# Patient Record
Sex: FEMALE | Race: WHITE | URBAN - METROPOLITAN AREA
[De-identification: names, ages, dates, MRNs, and addresses within clinical notes are randomized per-mention and may not be internally consistent; named-entity substitution may affect disease eponyms.]

---

## 2023-05-31 ENCOUNTER — OFFICE VISIT (OUTPATIENT)
Facility: LOCATION | Age: 78
End: 2023-05-31
Payer: MEDICARE

## 2023-05-31 DIAGNOSIS — Z96.1 PRESENCE OF INTRAOCULAR LENS: ICD-10-CM

## 2023-05-31 DIAGNOSIS — H34.8322 TRIBUTARY (BRANCH) RETINAL VEIN OCCLUSION, LEFT EYE, STABLE: Primary | ICD-10-CM

## 2023-05-31 PROCEDURE — 99204 OFFICE O/P NEW MOD 45 MIN: CPT | Performed by: OPHTHALMOLOGY

## 2023-05-31 PROCEDURE — 92134 CPTRZ OPH DX IMG PST SGM RTA: CPT | Performed by: OPHTHALMOLOGY

## 2023-05-31 ASSESSMENT — INTRAOCULAR PRESSURE
OD: 18
OS: 18

## 2023-05-31 NOTE — IMPRESSION/PLAN
Impression: Tributary (branch) retinal vein occlusion, left eye, stable: [id number].
- remote OCT: 
OD: wnl OS: superior atrophy Plan: The diagnosis, natural history, and prognosis of retinal vein occlusion, were discussed. The associations with macular edema and neovascularization were also discussed. Currently, there is no iris or angle neovascularization identified on examination, and the IOP is within normal limits. Risks, benefits, and alternatives of treatment options including laser, steroids, Eylea, Lucentis and Avastin, as well as the opportunity to participate in a clinical trial were discussed at length. The patient understands that treatment may not improve vision, but should reduce the risk of further visual loss. Given stability of vision and exam, pt elects to proceed with observe. Longstanding RVO.  

RTC PRN